# Patient Record
Sex: FEMALE | Race: OTHER
[De-identification: names, ages, dates, MRNs, and addresses within clinical notes are randomized per-mention and may not be internally consistent; named-entity substitution may affect disease eponyms.]

---

## 2018-09-21 ENCOUNTER — HOSPITAL ENCOUNTER (OUTPATIENT)
Dept: HOSPITAL 76 - EMS | Age: 28
Discharge: ANOTHER HOSPITAL | End: 2018-09-21
Attending: SURGERY
Payer: COMMERCIAL

## 2018-09-21 ENCOUNTER — HOSPITAL ENCOUNTER (EMERGENCY)
Dept: HOSPITAL 76 - ED | Age: 28
Discharge: LEFT BEFORE DISCHARGE | End: 2018-09-21
Payer: MEDICAID

## 2018-09-21 VITALS — DIASTOLIC BLOOD PRESSURE: 68 MMHG | SYSTOLIC BLOOD PRESSURE: 122 MMHG

## 2018-09-21 DIAGNOSIS — F33.9: Primary | ICD-10-CM

## 2018-09-21 DIAGNOSIS — R45.851: Primary | ICD-10-CM

## 2018-09-21 DIAGNOSIS — R45.851: ICD-10-CM

## 2018-09-21 DIAGNOSIS — S40.212A: ICD-10-CM

## 2018-09-21 LAB
ALBUMIN DIAFP-MCNC: 4.8 G/DL (ref 3.2–5.5)
ALBUMIN/GLOB SERPL: 1.2 {RATIO} (ref 1–2.2)
ALP SERPL-CCNC: 93 IU/L (ref 42–121)
ALT SERPL W P-5'-P-CCNC: 19 IU/L (ref 10–60)
AMPHET UR QL SCN: NEGATIVE
ANION GAP SERPL CALCULATED.4IONS-SCNC: 9 MMOL/L (ref 6–13)
APAP SERPL-MCNC: < 10 UG/ML (ref 10–30)
AST SERPL W P-5'-P-CCNC: 24 IU/L (ref 10–42)
BASOPHILS NFR BLD AUTO: 0.1 10^3/UL (ref 0–0.1)
BASOPHILS NFR BLD AUTO: 0.5 %
BENZODIAZ UR QL SCN: NEGATIVE
BILIRUB BLD-MCNC: 0.8 MG/DL (ref 0.2–1)
BUN SERPL-MCNC: 12 MG/DL (ref 6–20)
CALCIUM UR-MCNC: 9.3 MG/DL (ref 8.5–10.3)
CHLORIDE SERPL-SCNC: 103 MMOL/L (ref 101–111)
CLARITY UR REFRACT.AUTO: (no result)
CLARITY UR REFRACT.AUTO: CLEAR
CO2 SERPL-SCNC: 26 MMOL/L (ref 21–32)
COCAINE UR-SCNC: NEGATIVE UMOL/L
CREAT SERPLBLD-SCNC: 0.9 MG/DL (ref 0.4–1)
EOSINOPHIL # BLD AUTO: 0 10^3/UL (ref 0–0.7)
EOSINOPHIL NFR BLD AUTO: 0.4 %
ERYTHROCYTE [DISTWIDTH] IN BLOOD BY AUTOMATED COUNT: 13.6 % (ref 12–15)
GFRSERPLBLD MDRD-ARVRAT: 76 ML/MIN/{1.73_M2} (ref 89–?)
GLOBULIN SER-MCNC: 3.9 G/DL (ref 2.1–4.2)
GLUCOSE SERPL-MCNC: 100 MG/DL (ref 70–100)
GLUCOSE UR QL STRIP.AUTO: NEGATIVE MG/DL
GLUCOSE UR QL STRIP.AUTO: NEGATIVE MG/DL
HCG UR QL: NEGATIVE
HGB UR QL STRIP: 14.4 G/DL (ref 12–16)
KETONES UR QL STRIP.AUTO: (no result) MG/DL
KETONES UR QL STRIP.AUTO: 15 MG/DL
LIPASE SERPL-CCNC: 24 U/L (ref 22–51)
LYMPHOCYTES # SPEC AUTO: 1.8 10^3/UL (ref 1.5–3.5)
LYMPHOCYTES NFR BLD AUTO: 16.1 %
MCH RBC QN AUTO: 32.2 PG (ref 27–31)
MCHC RBC AUTO-ENTMCNC: 34.9 G/DL (ref 32–36)
MCV RBC AUTO: 92.3 FL (ref 81–99)
METHADONE UR QL SCN: NEGATIVE
METHAMPHET UR QL SCN: NEGATIVE
MONOCYTES # BLD AUTO: 0.8 10^3/UL (ref 0–1)
MONOCYTES NFR BLD AUTO: 7 %
NEUTROPHILS # BLD AUTO: 8.3 10^3/UL (ref 1.5–6.6)
NEUTROPHILS # SNV AUTO: 10.9 X10^3/UL (ref 4.8–10.8)
NEUTROPHILS NFR BLD AUTO: 76 %
NITRITE UR QL STRIP.AUTO: POSITIVE
NITRITE UR QL STRIP.AUTO: POSITIVE
OPIATES UR QL SCN: NEGATIVE
PDW BLD AUTO: 9.7 FL (ref 7.9–10.8)
PH UR STRIP.AUTO: 6 PH (ref 5–7.5)
PH UR STRIP.AUTO: 6 PH (ref 5–7.5)
PLATELET # BLD: 220 10^3/UL (ref 130–450)
PROT SPEC-MCNC: 8.7 G/DL (ref 6.7–8.2)
PROT UR STRIP.AUTO-MCNC: (no result) MG/DL
PROT UR STRIP.AUTO-MCNC: NEGATIVE MG/DL
RBC # UR STRIP.AUTO: (no result) /UL
RBC # UR STRIP.AUTO: (no result) /UL
RBC # URNS HPF: (no result) /HPF (ref 0–5)
RBC # URNS HPF: (no result) /HPF (ref 0–5)
RBC MAR: 4.49 10^6/UL (ref 4.2–5.4)
SALICYLATES SERPL-MCNC: < 6 MG/DL
SODIUM SERPLBLD-SCNC: 138 MMOL/L (ref 135–145)
SP GR UR STRIP.AUTO: <=1.005 (ref 1–1.03)
SP GR UR STRIP.AUTO: >=1.03 (ref 1–1.03)
SQUAMOUS URNS QL MICRO: (no result)
SQUAMOUS URNS QL MICRO: (no result)
UROBILINOGEN UR QL STRIP.AUTO: (no result) E.U./DL
UROBILINOGEN UR QL STRIP.AUTO: (no result) E.U./DL
UROBILINOGEN UR STRIP.AUTO-MCNC: NEGATIVE MG/DL
UROBILINOGEN UR STRIP.AUTO-MCNC: NEGATIVE MG/DL
VOLATILE DRUGS POS SERPL SCN: (no result)

## 2018-09-21 PROCEDURE — 81003 URINALYSIS AUTO W/O SCOPE: CPT

## 2018-09-21 PROCEDURE — 81025 URINE PREGNANCY TEST: CPT

## 2018-09-21 PROCEDURE — 36415 COLL VENOUS BLD VENIPUNCTURE: CPT

## 2018-09-21 PROCEDURE — 81001 URINALYSIS AUTO W/SCOPE: CPT

## 2018-09-21 PROCEDURE — 87086 URINE CULTURE/COLONY COUNT: CPT

## 2018-09-21 PROCEDURE — 84443 ASSAY THYROID STIM HORMONE: CPT

## 2018-09-21 PROCEDURE — 80053 COMPREHEN METABOLIC PANEL: CPT

## 2018-09-21 PROCEDURE — 80307 DRUG TEST PRSMV CHEM ANLYZR: CPT

## 2018-09-21 PROCEDURE — 85025 COMPLETE CBC W/AUTO DIFF WBC: CPT

## 2018-09-21 PROCEDURE — 99283 EMERGENCY DEPT VISIT LOW MDM: CPT

## 2018-09-21 PROCEDURE — 80320 DRUG SCREEN QUANTALCOHOLS: CPT

## 2018-09-21 PROCEDURE — 80329 ANALGESICS NON-OPIOID 1 OR 2: CPT

## 2018-09-21 PROCEDURE — 80306 DRUG TEST PRSMV INSTRMNT: CPT

## 2018-09-21 PROCEDURE — 87181 SC STD AGAR DILUTION PER AGT: CPT

## 2018-09-21 PROCEDURE — 83690 ASSAY OF LIPASE: CPT
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
## 2018-09-21 NOTE — ED PHYSICIAN DOCUMENTATION
ED Addendum





- Addendum


Addendum: 





09/21/18 22:53After tele-psych consultation I saw and examined the patient.  

Briefly she had suicidal ideation but is no longer suicidal for me.  I mainly 

went into the room to discuss the urine sample.  She has no symptoms of UTI and 

after discussion we decided to wait for the culture.  She wanted to be 

discharged.  This was in contrast to the [facility]ella psychiatric consultation which 

recommended inpatient admission.  I had a long discussion with the patient and 

her .  She feels safe at home would like to be discharged.  Given the 

language on the tele-psychiatric consultation, I could not good benoit simply 

discharge her.  That said I do not think she fits criteria for involuntary 

hospitalization.  As such she will sign AGAINST MEDICAL ADVICE and she 

understands she is welcome to return at any time for further evaluation and 

treatment.  We will culture her urine and she understands we may call her in a 

few days if the culture is positive.

## 2018-09-21 NOTE — TELEPSYCH PHYS NOTE
Telepsych Note





- CHIEF COMPLAINT/HX OF PRESENT ILLNESS


Cheif Complaint and History of Present Illness: 





Chief Complaint: "I wanted to come here before I did something."


HPI: The patient is a 28-year-old female with a history of depression. She 

presented the hospital complaining of depressed mood and suicidal thoughts or 

plan to cut herself or walk into traffic. The patient cites marital problems as 

the trigger for the depressed mood. She states that she has been having suicidal

thoughts for the past few weeks. She is prescribed Zoloft but stopped it 2 weeks

ago.








- SI/HI/SELF HARM


SI/HI/SELF HARM (CURRENT OR HISTORY OF):: SI





- VIOLENCE/LEGAL/COLLATERAL


Violence - Legal - Collateral: 





Violence: none


Legal: none


Collateral: n/a








- PSYCHIATRIC HX/TREATMENT HX


Psychiatric/Treatment Hx Other: 





Past Psychiatric History: No prior inpatient admissions. Current outpatient 

treatment-[facility],  sees therapist and psychiatrist. No prior suicide 

attempts. 





- DRUG/ALCOHOL HX


Substance use/abuse/alcohol text: 





none





- MEDICAL HX


Does the pt have a hx of MRSA?: No





- SURGICAL HX


Gynecologic:  section





- HOME MEDICATIONS


Home Meds (as last confirmed): 





                                 Patient History











 Medication  Instructions  Recorded  Confirmed


 


Sertraline [Zoloft] 25 mg 18 














- ALLERGIES


Allergies (as last confirmed): 





                                    Allergies











Allergy/AdvReac Type Severity Reaction Status Date / Time


 


No Known Drug Allergies Allergy   Verified 18 16:24














- FAMILY PSYCH/SUICIDE/SOCIAL HX-MENTAL


Family - Suicide - Social Hx and Mental Status Exam: 





Family Psychiatric History:  mother/grandmother-depression


Social History: , lives with  and 2 kids


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


Education: HS grad, some college (did not graduate)


Stressors: see HPI


 History: none


Abuse: none


Mental Status Examination: 


Attitude and behavior: cooperative


Speech: rapid, pressured


Affect and mood: sad affect and mood


Association and thought processes: linear


Thought content: no delusions, + SI, no HI


Perception: no hallucinations


Sensorium, memory, and orientation: AAOx3


Intellectual functioning: average


Insight and judgment: poor








- PATIENT PROBLEM LIST


(1) Major depressive disorder, recurrent severe without psychotic features


Impression: 


The patient is a 28-year-old  female with a history of Depressive. She 

is currently unstable with depressed mood and SI. Patient is not safe for 

discharge. Refer to inpatient care. Patient is agreeable to inpatient care








- TREATMENT/PHARMACOLOGICAL RECOMMENDATION


Treatment - Pharmacological - Therapy Recommendations: 





Treatment Recommendations: Admit to inpatient care, start medications


Pharmacological: start Zoloft 25 mg daily


Therapy: supportive


Level of Care: inpatient








- TIME SPENT & PROVIDER LOCATION


Telepsych consultation conducted via videoconferencing: Yes


List names and roles of persons who participated in consult: Hector Monge M.D.  [facility]


Telepsych Provider Location: Hector Monge M.D.


Time Telepsych consult began: 00:10


Time Telepsych consult completed: 00:30